# Patient Record
Sex: FEMALE | Race: WHITE | NOT HISPANIC OR LATINO | Employment: OTHER | URBAN - METROPOLITAN AREA
[De-identification: names, ages, dates, MRNs, and addresses within clinical notes are randomized per-mention and may not be internally consistent; named-entity substitution may affect disease eponyms.]

---

## 2019-04-25 ENCOUNTER — OFFICE VISIT (OUTPATIENT)
Dept: SURGERY | Facility: CLINIC | Age: 64
End: 2019-04-25

## 2019-04-25 VITALS — OXYGEN SATURATION: 97 % | BODY MASS INDEX: 33.63 KG/M2 | HEART RATE: 73 BPM | WEIGHT: 197 LBS | HEIGHT: 64 IN

## 2019-04-25 DIAGNOSIS — R07.9 CHEST PAIN OF UNKNOWN ETIOLOGY: Primary | ICD-10-CM

## 2019-04-25 PROCEDURE — 99244 OFF/OP CNSLTJ NEW/EST MOD 40: CPT | Performed by: SURGERY

## 2019-04-25 RX ORDER — PANTOPRAZOLE SODIUM 40 MG/1
40 TABLET, DELAYED RELEASE ORAL DAILY
COMMUNITY
End: 2022-12-27

## 2019-04-25 RX ORDER — VITAMIN E 268 MG
400 CAPSULE ORAL DAILY
COMMUNITY
End: 2022-12-27

## 2019-04-25 RX ORDER — ESTRADIOL 0.25 MG/.25G
1 GEL TOPICAL DAILY
COMMUNITY
End: 2022-12-27

## 2019-04-25 RX ORDER — LANOLIN ALCOHOL/MO/W.PET/CERES
1000 CREAM (GRAM) TOPICAL DAILY
COMMUNITY
End: 2022-12-27

## 2019-04-25 RX ORDER — SUCRALFATE 1 G/1
1 TABLET ORAL 4 TIMES DAILY
COMMUNITY
End: 2019-05-16 | Stop reason: HOSPADM

## 2019-04-25 RX ORDER — GLYCOPYRROLATE 2 MG/1
2 TABLET ORAL 3 TIMES DAILY
COMMUNITY
End: 2019-05-16 | Stop reason: HOSPADM

## 2019-04-25 NOTE — PROGRESS NOTES
SUMMARY (A/P):    64-year-old lady with intermittently severe chest pain with previous negative cardiac work-up.  Given this symptom, improvement with Protonix, negative CT scan, and mild dysphasia, I have recommended proceeding with upper endoscopy and she has scheduled accordingly.      CC:    Chest pain, referred for consultation by Dr. Knott    HPI:    64-year-old lady presents with 10-year history of intermittent at times severe pressure type pain in the midsternal region that is not necessarily associated with meals.  Protonix over the last year seems to have helped with her symptoms which overall have worsened over the years.  She denies any significant heartburn or reflux symptoms.  She does report mild mid upper chest dysphasia.    PSH:    Colonoscopy 2017  Cholecystectomy 2003  Hysterectomy 2002    PMH:    Arthritis  Nephrolithiasis    FAMILY HISTORY:    Paternal grandmother with colon cancer    SOCIAL HISTORY:   No tobacco use  No alcohol use    ALLERGIES: reviewed, in Epic    MEDICATIONS: reviewed, in Epic    ROS:  No chest pain or shortness of air.  All other systems reviewed and negative other than presenting complaints.    RADIOLOGY/ENDOSCOPY:    CT angiogram of the chest abdomen and pelvis 3/27/2019 demonstrated no acute abnormality    PHYSICAL EXAM:   Constitutional: Well-developed well-nourished, no acute distress  Vital signs: HR 73, weight 197 pounds, height 64 inches, BMI 33.8  Discussed with patient increased perioperative risks associated with obesity including increased risks of DVT, infection, seromas, poor wound healing and hernias (with abdominal surgery).  Eyes: Conjunctiva normal, sclera nonicteric  ENMT: Hearing grossly normal, oral mucosa moist  Neck: Supple, no palpable mass, normal thyroid, trachea midline  Respiratory: Clear to auscultation, normal inspiratory effort  Cardiovascular: Regular rate, no murmur, no carotid bruit, no peripheral edema, no jugular venous  distention  Gastrointestinal: Soft, nontender, no palpable mass, no hepatosplenomegaly, negative for hernia, bowel sounds normal  Lymphatics (palpable nodes):  cervical-negative, axillary-negative  Skin:  Warm, dry, no rash on visualized skin surfaces  Musculoskeletal: Symmetric strength, normal gait  Psychiatric: Alert and oriented ×3, normal affect     JETT HANSON M.D.

## 2019-04-29 PROBLEM — R07.9 CHEST PAIN OF UNKNOWN ETIOLOGY: Status: ACTIVE | Noted: 2019-04-29

## 2019-05-16 ENCOUNTER — ANESTHESIA (OUTPATIENT)
Dept: GASTROENTEROLOGY | Facility: HOSPITAL | Age: 64
End: 2019-05-16

## 2019-05-16 ENCOUNTER — HOSPITAL ENCOUNTER (OUTPATIENT)
Facility: HOSPITAL | Age: 64
Setting detail: HOSPITAL OUTPATIENT SURGERY
Discharge: HOME | End: 2019-05-16
Attending: SURGERY | Admitting: SURGERY

## 2019-05-16 ENCOUNTER — ANESTHESIA EVENT (OUTPATIENT)
Dept: GASTROENTEROLOGY | Facility: HOSPITAL | Age: 64
End: 2019-05-16

## 2019-05-16 VITALS
BODY MASS INDEX: 34.2 KG/M2 | SYSTOLIC BLOOD PRESSURE: 176 MMHG | HEIGHT: 63 IN | HEART RATE: 68 BPM | DIASTOLIC BLOOD PRESSURE: 93 MMHG | OXYGEN SATURATION: 98 % | RESPIRATION RATE: 24 BRPM | WEIGHT: 193 LBS

## 2019-05-16 DIAGNOSIS — R07.9 CHEST PAIN OF UNKNOWN ETIOLOGY: ICD-10-CM

## 2019-05-16 PROCEDURE — 43239 EGD BIOPSY SINGLE/MULTIPLE: CPT | Performed by: SURGERY

## 2019-05-16 PROCEDURE — 25010000002 PROPOFOL 10 MG/ML EMULSION: Performed by: NURSE ANESTHETIST, CERTIFIED REGISTERED

## 2019-05-16 PROCEDURE — 88305 TISSUE EXAM BY PATHOLOGIST: CPT | Performed by: SURGERY

## 2019-05-16 PROCEDURE — 88342 IMHCHEM/IMCYTCHM 1ST ANTB: CPT | Performed by: SURGERY

## 2019-05-16 RX ORDER — SODIUM CHLORIDE, SODIUM LACTATE, POTASSIUM CHLORIDE, CALCIUM CHLORIDE 600; 310; 30; 20 MG/100ML; MG/100ML; MG/100ML; MG/100ML
1000 INJECTION, SOLUTION INTRAVENOUS CONTINUOUS
Status: DISCONTINUED | OUTPATIENT
Start: 2019-05-16 | End: 2019-05-16 | Stop reason: HOSPADM

## 2019-05-16 RX ORDER — SODIUM CHLORIDE 0.9 % (FLUSH) 0.9 %
3 SYRINGE (ML) INJECTION AS NEEDED
Status: DISCONTINUED | OUTPATIENT
Start: 2019-05-16 | End: 2019-05-16 | Stop reason: HOSPADM

## 2019-05-16 RX ORDER — SODIUM CHLORIDE, SODIUM LACTATE, POTASSIUM CHLORIDE, CALCIUM CHLORIDE 600; 310; 30; 20 MG/100ML; MG/100ML; MG/100ML; MG/100ML
INJECTION, SOLUTION INTRAVENOUS CONTINUOUS PRN
Status: DISCONTINUED | OUTPATIENT
Start: 2019-05-16 | End: 2019-05-16 | Stop reason: SURG

## 2019-05-16 RX ORDER — PROPOFOL 10 MG/ML
VIAL (ML) INTRAVENOUS CONTINUOUS PRN
Status: DISCONTINUED | OUTPATIENT
Start: 2019-05-16 | End: 2019-05-16 | Stop reason: SURG

## 2019-05-16 RX ADMIN — SODIUM CHLORIDE, POTASSIUM CHLORIDE, SODIUM LACTATE AND CALCIUM CHLORIDE 1000 ML: 600; 310; 30; 20 INJECTION, SOLUTION INTRAVENOUS at 13:17

## 2019-05-16 RX ADMIN — PROPOFOL 100 MCG/KG/MIN: 10 INJECTION, EMULSION INTRAVENOUS at 13:46

## 2019-05-16 RX ADMIN — SODIUM CHLORIDE, POTASSIUM CHLORIDE, SODIUM LACTATE AND CALCIUM CHLORIDE: 600; 310; 30; 20 INJECTION, SOLUTION INTRAVENOUS at 13:44

## 2019-05-16 NOTE — ANESTHESIA POSTPROCEDURE EVALUATION
"Patient: Viviana Hyatt    Procedure Summary     Date:  05/16/19 Room / Location:   [facility]    Anesthesia Start:  1343 Anesthesia Stop:  1355    Procedure:  ESOPHAGOGASTRODUODENOSCOPY WITH COLD BIOPSIES (N/A Esophagus) Diagnosis:       Chest pain of unknown etiology      (Chest pain of unknown etiology [R07.89])    Surgeon:  Scar Turpin MD Provider:  Nila Christianson MD    Anesthesia Type:  MAC ASA Status:  2          Anesthesia Type: MAC  Last vitals  BP   176/93 (05/16/19 1417)   Temp       Pulse   68 (05/16/19 1417)   Resp   24 (05/16/19 1417)     SpO2   98 % (05/16/19 1417)     Post Anesthesia Care and Evaluation    Patient participation: complete - patient cannot participate  Anesthetic complications: No anesthetic complications    Cardiovascular status: acceptable  Respiratory status: acceptable  Hydration status: acceptable    Comments: Patient was discharged prior to being evaluated by Anesthesia...per chart review, no anesthetic complications were noted.  NOT MEDICALLY DIRECTED  /93 (BP Location: Left arm, Patient Position: Lying)   Pulse 68   Resp 24   Ht 160 cm (63\")   Wt 87.5 kg (193 lb)   SpO2 98%   BMI 34.19 kg/m²         "

## 2019-05-16 NOTE — OP NOTE
PREOPERATIVE DIAGNOSIS:  Atypical chest pain    POSTOPERATIVE DIAGNOSIS AND FINDINGS:  Mild to moderate antritis    PROCEDURE:  EGD with biopsy of gastric antrum    SURGEON:  Scar Turpin MD    ANESTHESIA:  MAC    SPECIMEN:  Antral biopsies    DESCRIPTION:  In decubitus position endoscope was inserted into the esophagus, stomach and duodenum. Antegrade and retroflex view of stomach performed.    There were no gross abnormalities of the first, second or third portions of the duodenum.    Gastric antrum, body and retroflexed view of stomach were normal with the exception of mild to moderate antritis with several erosions.  Several biopsies taken.    GE junction and esophagus were normal.  No hiatal hernia present.    Tolerated well.    Scar Turpin M.D.

## 2019-05-16 NOTE — ANESTHESIA PREPROCEDURE EVALUATION
Anesthesia Evaluation     Patient summary reviewed and Nursing notes reviewed   NPO Solid Status: > 8 hours  NPO Liquid Status: > 2 hours           Airway   Mallampati: II  TM distance: >3 FB  Neck ROM: full  Dental - normal exam     Pulmonary - negative pulmonary ROS and normal exam   Cardiovascular - negative cardio ROS and normal exam        Neuro/Psych- negative ROS  GI/Hepatic/Renal/Endo - negative ROS     Musculoskeletal     Abdominal    Substance History - negative use     OB/GYN negative ob/gyn ROS         Other   (+) arthritis                     Anesthesia Plan    ASA 2     MAC     Anesthetic plan, all risks, benefits, and alternatives have been provided, discussed and informed consent has been obtained with: patient.

## 2019-05-17 LAB
CYTO UR: NORMAL
LAB AP CASE REPORT: NORMAL
LAB AP DIAGNOSIS COMMENT: NORMAL
LAB AP SPECIAL STAINS: NORMAL
PATH REPORT.FINAL DX SPEC: NORMAL
PATH REPORT.GROSS SPEC: NORMAL

## 2019-05-19 ENCOUNTER — DOCUMENTATION (OUTPATIENT)
Dept: SURGERY | Facility: CLINIC | Age: 64
End: 2019-05-19

## 2019-05-19 NOTE — PROGRESS NOTES
ENDOSCOPY FOLLOW UP NOTE    EGD 5/16/2019    Indication:  Atypical chest pain    Findings:  Mild to moderate antritis    Pathology:  Minimally active chronic inflammation.  Rare Helicobacter-like organisms present.    Recommendations:  With the gross findings of antritis and microscopic findings of Helicobacter-like organisms, I have recommended a course of treatment for her Helicobacter.  If this resolves her symptoms then no further treatment or work-up would be warranted.    Scar Turpin M.D.

## 2019-05-21 ENCOUNTER — TELEPHONE (OUTPATIENT)
Dept: SURGERY | Facility: CLINIC | Age: 64
End: 2019-05-21

## 2019-05-21 NOTE — TELEPHONE ENCOUNTER
----- Message from Scar Turpin MD sent at 5/19/2019 10:44 AM EDT -----  Please let her know that she had inflammation of her stomach and biopsies revealed Helicobacter pylori organisms.  This could definitely be responsible for her symptoms and please initiate H. pylori treatment protocol.

## 2019-05-23 ENCOUNTER — TELEPHONE (OUTPATIENT)
Dept: SURGERY | Facility: CLINIC | Age: 64
End: 2019-05-23

## 2019-07-09 ENCOUNTER — TELEPHONE (OUTPATIENT)
Dept: SURGERY | Facility: CLINIC | Age: 64
End: 2019-07-09

## 2019-07-09 ENCOUNTER — LAB (OUTPATIENT)
Dept: LAB | Facility: HOSPITAL | Age: 64
End: 2019-07-09

## 2019-07-09 ENCOUNTER — TRANSCRIBE ORDERS (OUTPATIENT)
Dept: SURGERY | Facility: CLINIC | Age: 64
End: 2019-07-09

## 2019-07-09 DIAGNOSIS — A04.8 H. PYLORI INFECTION: ICD-10-CM

## 2019-07-09 DIAGNOSIS — A04.8 H. PYLORI INFECTION: Primary | ICD-10-CM

## 2019-07-09 PROCEDURE — 83013 H PYLORI (C-13) BREATH: CPT

## 2019-07-10 LAB — UREA BREATH TEST QL: NEGATIVE

## 2022-08-31 ENCOUNTER — APPOINTMENT (OUTPATIENT)
Dept: MRI IMAGING | Facility: HOSPITAL | Age: 67
End: 2022-08-31

## 2022-08-31 ENCOUNTER — APPOINTMENT (OUTPATIENT)
Dept: GENERAL RADIOLOGY | Facility: HOSPITAL | Age: 67
End: 2022-08-31

## 2022-08-31 ENCOUNTER — HOSPITAL ENCOUNTER (EMERGENCY)
Facility: HOSPITAL | Age: 67
Discharge: HOME | End: 2022-08-31
Attending: EMERGENCY MEDICINE | Admitting: EMERGENCY MEDICINE

## 2022-08-31 VITALS
HEART RATE: 67 BPM | WEIGHT: 197 LBS | BODY MASS INDEX: 34.91 KG/M2 | RESPIRATION RATE: 18 BRPM | DIASTOLIC BLOOD PRESSURE: 100 MMHG | TEMPERATURE: 98 F | HEIGHT: 63 IN | OXYGEN SATURATION: 98 % | SYSTOLIC BLOOD PRESSURE: 192 MMHG

## 2022-08-31 DIAGNOSIS — H81.392 PERIPHERAL VERTIGO INVOLVING LEFT EAR: Primary | ICD-10-CM

## 2022-08-31 LAB
ALBUMIN SERPL-MCNC: 5 G/DL (ref 3.5–5.2)
ALBUMIN/GLOB SERPL: 1.9 G/DL
ALP SERPL-CCNC: 93 U/L (ref 39–117)
ALT SERPL W P-5'-P-CCNC: 39 U/L (ref 1–33)
ANION GAP SERPL CALCULATED.3IONS-SCNC: 7 MMOL/L (ref 5–15)
AST SERPL-CCNC: 34 U/L (ref 1–32)
BASOPHILS # BLD AUTO: 0.04 10*3/MM3 (ref 0–0.2)
BASOPHILS NFR BLD AUTO: 0.7 % (ref 0–1.5)
BILIRUB SERPL-MCNC: 0.8 MG/DL (ref 0–1.2)
BUN SERPL-MCNC: 12 MG/DL (ref 8–23)
BUN/CREAT SERPL: 19 (ref 7–25)
CALCIUM SPEC-SCNC: 10.3 MG/DL (ref 8.6–10.5)
CHLORIDE SERPL-SCNC: 105 MMOL/L (ref 98–107)
CO2 SERPL-SCNC: 32 MMOL/L (ref 22–29)
CREAT SERPL-MCNC: 0.63 MG/DL (ref 0.57–1)
DEPRECATED RDW RBC AUTO: 43.4 FL (ref 37–54)
EGFRCR SERPLBLD CKD-EPI 2021: 98 ML/MIN/1.73
EOSINOPHIL # BLD AUTO: 0.02 10*3/MM3 (ref 0–0.4)
EOSINOPHIL NFR BLD AUTO: 0.4 % (ref 0.3–6.2)
ERYTHROCYTE [DISTWIDTH] IN BLOOD BY AUTOMATED COUNT: 12 % (ref 12.3–15.4)
GLOBULIN UR ELPH-MCNC: 2.7 GM/DL
GLUCOSE SERPL-MCNC: 106 MG/DL (ref 65–99)
HCT VFR BLD AUTO: 48.7 % (ref 34–46.6)
HGB BLD-MCNC: 17.1 G/DL (ref 12–15.9)
HOLD SPECIMEN: NORMAL
IMM GRANULOCYTES # BLD AUTO: 0.02 10*3/MM3 (ref 0–0.05)
IMM GRANULOCYTES NFR BLD AUTO: 0.4 % (ref 0–0.5)
LIPASE SERPL-CCNC: 14 U/L (ref 13–60)
LYMPHOCYTES # BLD AUTO: 1.47 10*3/MM3 (ref 0.7–3.1)
LYMPHOCYTES NFR BLD AUTO: 26.9 % (ref 19.6–45.3)
MCH RBC QN AUTO: 34.3 PG (ref 26.6–33)
MCHC RBC AUTO-ENTMCNC: 35.1 G/DL (ref 31.5–35.7)
MCV RBC AUTO: 97.6 FL (ref 79–97)
MONOCYTES # BLD AUTO: 0.63 10*3/MM3 (ref 0.1–0.9)
MONOCYTES NFR BLD AUTO: 11.5 % (ref 5–12)
NEUTROPHILS NFR BLD AUTO: 3.28 10*3/MM3 (ref 1.7–7)
NEUTROPHILS NFR BLD AUTO: 60.1 % (ref 42.7–76)
NRBC BLD AUTO-RTO: 0 /100 WBC (ref 0–0.2)
NT-PROBNP SERPL-MCNC: 56.2 PG/ML (ref 0–900)
PLATELET # BLD AUTO: 204 10*3/MM3 (ref 140–450)
PMV BLD AUTO: 10.8 FL (ref 6–12)
POTASSIUM SERPL-SCNC: 4.8 MMOL/L (ref 3.5–5.2)
PROT SERPL-MCNC: 7.7 G/DL (ref 6–8.5)
RBC # BLD AUTO: 4.99 10*6/MM3 (ref 3.77–5.28)
SODIUM SERPL-SCNC: 144 MMOL/L (ref 136–145)
TROPONIN T SERPL-MCNC: <0.01 NG/ML (ref 0–0.03)
TROPONIN T SERPL-MCNC: <0.01 NG/ML (ref 0–0.03)
WBC NRBC COR # BLD: 5.46 10*3/MM3 (ref 3.4–10.8)
WHOLE BLOOD HOLD COAG: NORMAL
WHOLE BLOOD HOLD SPECIMEN: NORMAL

## 2022-08-31 PROCEDURE — 85025 COMPLETE CBC W/AUTO DIFF WBC: CPT | Performed by: EMERGENCY MEDICINE

## 2022-08-31 PROCEDURE — 80053 COMPREHEN METABOLIC PANEL: CPT | Performed by: EMERGENCY MEDICINE

## 2022-08-31 PROCEDURE — 36415 COLL VENOUS BLD VENIPUNCTURE: CPT

## 2022-08-31 PROCEDURE — 99284 EMERGENCY DEPT VISIT MOD MDM: CPT

## 2022-08-31 PROCEDURE — 71045 X-RAY EXAM CHEST 1 VIEW: CPT

## 2022-08-31 PROCEDURE — 63710000001 ONDANSETRON ODT 4 MG TABLET DISPERSIBLE: Performed by: EMERGENCY MEDICINE

## 2022-08-31 PROCEDURE — 83880 ASSAY OF NATRIURETIC PEPTIDE: CPT | Performed by: EMERGENCY MEDICINE

## 2022-08-31 PROCEDURE — 70551 MRI BRAIN STEM W/O DYE: CPT

## 2022-08-31 PROCEDURE — 84484 ASSAY OF TROPONIN QUANT: CPT | Performed by: EMERGENCY MEDICINE

## 2022-08-31 PROCEDURE — 83690 ASSAY OF LIPASE: CPT | Performed by: EMERGENCY MEDICINE

## 2022-08-31 PROCEDURE — 93005 ELECTROCARDIOGRAM TRACING: CPT | Performed by: EMERGENCY MEDICINE

## 2022-08-31 RX ORDER — SODIUM CHLORIDE 0.9 % (FLUSH) 0.9 %
10 SYRINGE (ML) INJECTION AS NEEDED
Status: DISCONTINUED | OUTPATIENT
Start: 2022-08-31 | End: 2022-08-31 | Stop reason: HOSPADM

## 2022-08-31 RX ORDER — MECLIZINE HYDROCHLORIDE 25 MG/1
25 TABLET ORAL EVERY 6 HOURS PRN
Qty: 20 TABLET | Refills: 0 | Status: SHIPPED | OUTPATIENT
Start: 2022-08-31 | End: 2022-09-05

## 2022-08-31 RX ORDER — ONDANSETRON 4 MG/1
4 TABLET, ORALLY DISINTEGRATING ORAL ONCE
Status: COMPLETED | OUTPATIENT
Start: 2022-08-31 | End: 2022-08-31

## 2022-08-31 RX ORDER — MECLIZINE HYDROCHLORIDE 25 MG/1
25 TABLET ORAL ONCE
Status: COMPLETED | OUTPATIENT
Start: 2022-08-31 | End: 2022-08-31

## 2022-08-31 RX ORDER — ASPIRIN 81 MG/1
324 TABLET, CHEWABLE ORAL ONCE
Status: COMPLETED | OUTPATIENT
Start: 2022-08-31 | End: 2022-08-31

## 2022-08-31 RX ADMIN — ASPIRIN 81 MG 324 MG: 81 TABLET ORAL at 20:10

## 2022-08-31 RX ADMIN — SODIUM CHLORIDE 1000 ML: 9 INJECTION, SOLUTION INTRAVENOUS at 18:48

## 2022-08-31 RX ADMIN — MECLIZINE HYDROCHLORIDE 25 MG: 25 TABLET ORAL at 18:03

## 2022-08-31 RX ADMIN — ONDANSETRON 4 MG: 4 TABLET, ORALLY DISINTEGRATING ORAL at 18:03

## 2022-09-01 NOTE — DISCHARGE INSTRUCTIONS
Make sure the rest and drink plenty of fluids.    Take meclizine as needed to help with dizziness.    Follow-up with primary care physician for further outpatient evaluation.

## 2022-09-02 LAB
QT INTERVAL: 404 MS
QT INTERVAL: 406 MS
QTC INTERVAL: 431 MS
QTC INTERVAL: 436 MS

## 2022-11-08 ENCOUNTER — PREP FOR SURGERY (OUTPATIENT)
Dept: OTHER | Facility: HOSPITAL | Age: 67
End: 2022-11-08

## 2022-11-08 DIAGNOSIS — Z86.010 HISTORY OF ADENOMATOUS POLYP OF COLON: Primary | ICD-10-CM

## 2022-12-27 ENCOUNTER — OFFICE VISIT (OUTPATIENT)
Dept: SURGERY | Facility: CLINIC | Age: 67
End: 2022-12-27

## 2022-12-27 VITALS — HEIGHT: 63 IN | BODY MASS INDEX: 35.08 KG/M2 | WEIGHT: 198 LBS

## 2022-12-27 DIAGNOSIS — Z12.11 SCREEN FOR COLON CANCER: Primary | ICD-10-CM

## 2022-12-27 DIAGNOSIS — R10.32 LLQ ABDOMINAL PAIN: ICD-10-CM

## 2022-12-27 PROBLEM — Z86.010 HISTORY OF ADENOMATOUS POLYP OF COLON: Status: ACTIVE | Noted: 2022-12-27

## 2022-12-27 PROCEDURE — 99203 OFFICE O/P NEW LOW 30 MIN: CPT | Performed by: PHYSICIAN ASSISTANT

## 2022-12-27 RX ORDER — LOSARTAN POTASSIUM 50 MG/1
50 TABLET ORAL DAILY
COMMUNITY
Start: 2022-10-31

## 2022-12-27 NOTE — H&P (VIEW-ONLY)
ASSESSMENT/PLAN:    This is a 67-year-old lady presenting with recent left lower quadrant/left groin pain more consistent with pain that she is experienced with passing kidney stones in the past.  She did have a negative CT scan of the abdomen and pelvis without evidence of an obstructing kidney stone.  She does bring up the changes to her stool habit with more mucousy stools of recent.  With this and the need for her routine screening colonoscopy already being necessary, I am recommending that we proceed with a colonoscopy with possible random biopsies.  Risks and rationale were discussed with her with risk including but not limited to bleeding, infection, bowel perforation and the need for additional procedures.  Any additional follow-up will be discussed once the results of been reviewed.  All questions were answered and she was willing to proceed with all recommendations.    CC:     Left lower quadrant/groin abdominal pain    HPI:    This is a 67-year-old lady presenting to the office today as a self-referral.  She last saw Dr. Turpin in 2019 at the time of her EGD.  She was due to be scheduled for her routine screening colonoscopy but at the time of scheduling, she developed sharp left lower quadrant and groin pain.  This lasted for approximately 2 to 3 weeks and was very similar to previous pains that she had experienced with kidney stones.  Due to this she went to her urologist who performed a CT scan which did not demonstrate an obstructing kidney stone but did note a decrease in the stone burden of her kidneys without any additional acute abnormalities noted.  Since that 2 to 3-week period, her pain has improved dramatically and now she only has some minor discomfort on occasion in that area.  She does note that she has had more mucousy stools of recent but aside from that denies having abdominal pain elsewhere, nausea, vomiting, fever, chills, dark tarry stools, bright red blood with her bowel movements  "or any other complaints.    ENDOSCOPY:   • Colonoscopy-2017 history of colon polyps    RADIOLOGY:   • CT scan of the abdomen pelvis: 12/12/2022 no suspicious findings    SOCIAL HISTORY:   • Denies tobacco use  • Denies alcohol use    FAMILY HISTORY:    • Colorectal cancer: Paternal grandmother    PREVIOUS ABDOMINAL SURGERY    • Hysterectomy  • Cholecystectomy    OTHER SURGERY  Past Surgical History:   Procedure Laterality Date   • BREAST BIOPSY Left 04/2017   • CHOLECYSTECTOMY N/A 2003   • COLONOSCOPY N/A 03/24/2017    Mild diverticulosis, small polyp in the distal descending colon-Dr. Juárez   • ENDOSCOPY N/A 05/16/2019    Procedure: ESOPHAGOGASTRODUODENOSCOPY WITH COLD BIOPSIES;  Surgeon: Scar Turpin MD;  Location: [facility] END[facility]COPY;  Service: General   • HYSTERECTOMY N/A 06/2002       PAST MEDICAL HISTORY:    Past Medical History:   Diagnosis Date   • Arthritis    • Colon polyp    • Diverticulosis    • History of Helicobacter pylori infection    • Kidney stones    • Skin cancer     Chest       MEDICATIONS:     Current Outpatient Medications:   •  losartan (COZAAR) 50 MG tablet, Take 50 mg by mouth Daily., Disp: , Rfl:     ALLERGIES:   No Known Allergies    PHYSICAL EXAM:   • Constitutional: Well-developed well-nourished, no acute distress  • Vital signs:   o Height 63\"  o Weight 198  o BMI 35.1 Discussed with patient increased perioperative risks associated with obesity including increased risks of DVT, infection, seromas, poor wound healing and hernias (with abdominal surgery).  • Neck: Supple, no palpable mass, trachea midline  • Respiratory: Clear to auscultation, normal inspiratory effort  • Cardiovascular: Regular rate, no murmur, no carotid bruit  • Gastrointestinal: Soft, nontender  • Psychiatric: Alert and oriented ×3, normal affect       Jhonny Hawkins PA-C    "

## 2022-12-27 NOTE — PROGRESS NOTES
ASSESSMENT/PLAN:    This is a 67-year-old lady presenting with recent left lower quadrant/left groin pain more consistent with pain that she is experienced with passing kidney stones in the past.  She did have a negative CT scan of the abdomen and pelvis without evidence of an obstructing kidney stone.  She does bring up the changes to her stool habit with more mucousy stools of recent.  With this and the need for her routine screening colonoscopy already being necessary, I am recommending that we proceed with a colonoscopy with possible random biopsies.  Risks and rationale were discussed with her with risk including but not limited to bleeding, infection, bowel perforation and the need for additional procedures.  Any additional follow-up will be discussed once the results of been reviewed.  All questions were answered and she was willing to proceed with all recommendations.    CC:     Left lower quadrant/groin abdominal pain    HPI:    This is a 67-year-old lady presenting to the office today as a self-referral.  She last saw Dr. Turpin in 2019 at the time of her EGD.  She was due to be scheduled for her routine screening colonoscopy but at the time of scheduling, she developed sharp left lower quadrant and groin pain.  This lasted for approximately 2 to 3 weeks and was very similar to previous pains that she had experienced with kidney stones.  Due to this she went to her urologist who performed a CT scan which did not demonstrate an obstructing kidney stone but did note a decrease in the stone burden of her kidneys without any additional acute abnormalities noted.  Since that 2 to 3-week period, her pain has improved dramatically and now she only has some minor discomfort on occasion in that area.  She does note that she has had more mucousy stools of recent but aside from that denies having abdominal pain elsewhere, nausea, vomiting, fever, chills, dark tarry stools, bright red blood with her bowel movements  "or any other complaints.    ENDOSCOPY:   • Colonoscopy-2017 history of colon polyps    RADIOLOGY:   • CT scan of the abdomen pelvis: 12/12/2022 no suspicious findings    SOCIAL HISTORY:   • Denies tobacco use  • Denies alcohol use    FAMILY HISTORY:    • Colorectal cancer: Paternal grandmother    PREVIOUS ABDOMINAL SURGERY    • Hysterectomy  • Cholecystectomy    OTHER SURGERY  Past Surgical History:   Procedure Laterality Date   • BREAST BIOPSY Left 04/2017   • CHOLECYSTECTOMY N/A 2003   • COLONOSCOPY N/A 03/24/2017    Mild diverticulosis, small polyp in the distal descending colon-Dr. Juárez   • ENDOSCOPY N/A 05/16/2019    Procedure: ESOPHAGOGASTRODUODENOSCOPY WITH COLD BIOPSIES;  Surgeon: Scar Turpin MD;  Location: [facility] ENDOSCOPY;  Service: General   • HYSTERECTOMY N/A 06/2002       PAST MEDICAL HISTORY:    Past Medical History:   Diagnosis Date   • Arthritis    • Colon polyp    • Diverticulosis    • History of Helicobacter pylori infection    • Kidney stones    • Skin cancer     Chest       MEDICATIONS:     Current Outpatient Medications:   •  losartan (COZAAR) 50 MG tablet, Take 50 mg by mouth Daily., Disp: , Rfl:     ALLERGIES:   No Known Allergies    PHYSICAL EXAM:   • Constitutional: Well-developed well-nourished, no acute distress  • Vital signs:   o Height 63\"  o Weight 198  o BMI 35.1 Discussed with patient increased perioperative risks associated with obesity including increased risks of DVT, infection, seromas, poor wound healing and hernias (with abdominal surgery).  • Neck: Supple, no palpable mass, trachea midline  • Respiratory: Clear to auscultation, normal inspiratory effort  • Cardiovascular: Regular rate, no murmur, no carotid bruit  • Gastrointestinal: Soft, nontender  • Psychiatric: Alert and oriented ×3, normal affect       Jhonny Hawkins PA-C    "

## 2023-01-19 ENCOUNTER — ANESTHESIA (OUTPATIENT)
Dept: GASTROENTEROLOGY | Facility: HOSPITAL | Age: 68
End: 2023-01-19
Payer: MEDICARE

## 2023-01-19 ENCOUNTER — HOSPITAL ENCOUNTER (OUTPATIENT)
Facility: HOSPITAL | Age: 68
Setting detail: HOSPITAL OUTPATIENT SURGERY
Discharge: HOME | End: 2023-01-19
Attending: SURGERY | Admitting: SURGERY
Payer: MEDICARE

## 2023-01-19 ENCOUNTER — ANESTHESIA EVENT (OUTPATIENT)
Dept: GASTROENTEROLOGY | Facility: HOSPITAL | Age: 68
End: 2023-01-19
Payer: MEDICARE

## 2023-01-19 VITALS
HEIGHT: 64 IN | TEMPERATURE: 97.3 F | SYSTOLIC BLOOD PRESSURE: 144 MMHG | RESPIRATION RATE: 18 BRPM | OXYGEN SATURATION: 95 % | HEART RATE: 64 BPM | WEIGHT: 195 LBS | DIASTOLIC BLOOD PRESSURE: 85 MMHG | BODY MASS INDEX: 33.29 KG/M2

## 2023-01-19 PROCEDURE — 25010000002 PROPOFOL 10 MG/ML EMULSION: Performed by: NURSE ANESTHETIST, CERTIFIED REGISTERED

## 2023-01-19 PROCEDURE — G0105 COLORECTAL SCRN; HI RISK IND: HCPCS | Performed by: SURGERY

## 2023-01-19 RX ORDER — LIDOCAINE HYDROCHLORIDE 20 MG/ML
INJECTION, SOLUTION INFILTRATION; PERINEURAL AS NEEDED
Status: DISCONTINUED | OUTPATIENT
Start: 2023-01-19 | End: 2023-01-19 | Stop reason: SURG

## 2023-01-19 RX ORDER — SODIUM CHLORIDE, SODIUM LACTATE, POTASSIUM CHLORIDE, CALCIUM CHLORIDE 600; 310; 30; 20 MG/100ML; MG/100ML; MG/100ML; MG/100ML
30 INJECTION, SOLUTION INTRAVENOUS CONTINUOUS PRN
Status: DISCONTINUED | OUTPATIENT
Start: 2023-01-19 | End: 2023-01-19 | Stop reason: HOSPADM

## 2023-01-19 RX ADMIN — LIDOCAINE HYDROCHLORIDE 100 MG: 20 INJECTION, SOLUTION INFILTRATION; PERINEURAL at 14:04

## 2023-01-19 RX ADMIN — SODIUM CHLORIDE, POTASSIUM CHLORIDE, SODIUM LACTATE AND CALCIUM CHLORIDE 30 ML/HR: 600; 310; 30; 20 INJECTION, SOLUTION INTRAVENOUS at 13:28

## 2023-01-19 RX ADMIN — PROPOFOL 200 MCG/KG/MIN: 10 INJECTION, EMULSION INTRAVENOUS at 14:04

## 2023-01-19 NOTE — DISCHARGE INSTRUCTIONS

## 2023-01-19 NOTE — OP NOTE
PREOPERATIVE DIAGNOSIS:  • High risk screening secondary to personal history of polyps    POSTOPERATIVE DIAGNOSIS AND FINDINGS:  • Diverticulosis    PROCEDURE:  Colonoscopy to terminal ileum    SURGEON:  Scar Turpin MD    ANESTHESIA:  MAC    SPECIMEN(S):  • none    DESCRIPTION:  In decubitus position digital rectal exam was normal. Colonoscope inserted under direct visualization of lumen to cecum confirmed by visualization of ileocecal valve and appendiceal orifice.  Ileocecal valve was intubated and terminal ileum was grossly normal.  Scope was slowly withdrawn circumferentially examining all mucosal surfaces.  Bowel preparation was excellent and there were no mucosal abnormalities.  Sigmoid diverticulosis was present.  Tolerated well.    RECOMMENDATION FOR FUTURE SURVEILLANCE:  5 years    Scar Turpin M.D.

## 2023-01-19 NOTE — ANESTHESIA PREPROCEDURE EVALUATION
Anesthesia Evaluation     Patient summary reviewed   no history of anesthetic complications:  NPO Solid Status: > 8 hours  NPO Liquid Status: > 2 hours           Airway   Mallampati: II  TM distance: >3 FB  Neck ROM: full  No difficulty expected  Dental - normal exam     Pulmonary     breath sounds clear to auscultation  (-) shortness of breath, recent URI, not a smoker  Cardiovascular   Exercise tolerance: good (4-7 METS)    Rhythm: regular  Rate: normal    (+) hypertension, valvular problems/murmurs MVP,   (-) past MI, dysrhythmias, angina      Neuro/Psych  (-) seizures, CVADizziness: vertigo episode, 8/2022.  GI/Hepatic/Renal/Endo    (+) obesity,   renal disease (h/o) stones,   (-) diabetes    Musculoskeletal     (-) neck stiffness  Abdominal    Substance History      OB/GYN          Other   arthritis,                        Anesthesia Plan    ASA 2     MAC     (MAC anesthesia discussed with patient and/or patient representative. Risks (including but not limited to intra-op awareness), benefits, and alternatives were discussed. Understanding was voiced with an agreement to proceed with a MAC technique and General as a backup option. )    Anesthetic plan, risks, benefits, and alternatives have been provided, discussed and informed consent has been obtained with: patient.    Plan discussed with CRNA.

## 2023-01-19 NOTE — ANESTHESIA POSTPROCEDURE EVALUATION
"Patient: Viviana Hyatt    Procedure Summary     Date: 01/19/23 Room / Location:  [facility]    Anesthesia Start: 1358 Anesthesia Stop: 1418    Procedure: COLONOSCOPY to cecum into TI Diagnosis:       History of adenomatous polyp of colon      (History of adenomatous polyp of colon [Z86.010])    Surgeons: Scar Turpin MD Provider: Sarbjit Sesay DO    Anesthesia Type: MAC ASA Status: 2          Anesthesia Type: MAC    Vitals  Vitals Value Taken Time   /71 01/19/23 1418   Temp     Pulse 65 01/19/23 1425   Resp 18 01/19/23 1418   SpO2 99 % 01/19/23 1425   Vitals shown include unvalidated device data.        Post Anesthesia Care and Evaluation    Patient location during evaluation: bedside  Patient participation: waiting for patient participation  Level of consciousness: sleepy but conscious and responsive to verbal stimuli  Pain management: adequate    Airway patency: patent  Anesthetic complications: No anesthetic complications  PONV Status: NA  Cardiovascular status: acceptable and hemodynamically stable  Respiratory status: acceptable, spontaneous ventilation and nonlabored ventilation  Hydration status: acceptable    Comments: /71 (BP Location: Left arm, Patient Position: Lying)   Pulse 67   Resp 18   Ht 161.3 cm (63.5\")   Wt 88.5 kg (195 lb)   SpO2 97%   BMI 34.00 kg/m²       "

## 2024-02-20 ENCOUNTER — OFFICE VISIT (OUTPATIENT)
Dept: CARDIOLOGY | Facility: CLINIC | Age: 69
End: 2024-02-20
Payer: MEDICARE

## 2024-02-20 VITALS
BODY MASS INDEX: 33.26 KG/M2 | DIASTOLIC BLOOD PRESSURE: 84 MMHG | WEIGHT: 194.8 LBS | HEIGHT: 64 IN | HEART RATE: 62 BPM | SYSTOLIC BLOOD PRESSURE: 128 MMHG

## 2024-02-20 DIAGNOSIS — I34.1 MITRAL VALVE PROLAPSE: ICD-10-CM

## 2024-02-20 DIAGNOSIS — R07.2 PRECORDIAL PAIN: Primary | ICD-10-CM

## 2024-02-20 DIAGNOSIS — I10 ESSENTIAL HYPERTENSION: ICD-10-CM

## 2024-02-20 DIAGNOSIS — R06.09 EXERTIONAL DYSPNEA: ICD-10-CM

## 2024-02-20 DIAGNOSIS — R00.2 PALPITATIONS: ICD-10-CM

## 2024-02-20 PROCEDURE — 93000 ELECTROCARDIOGRAM COMPLETE: CPT | Performed by: INTERNAL MEDICINE

## 2024-02-20 PROCEDURE — 99204 OFFICE O/P NEW MOD 45 MIN: CPT | Performed by: INTERNAL MEDICINE

## 2024-02-20 PROCEDURE — 1159F MED LIST DOCD IN RCRD: CPT | Performed by: INTERNAL MEDICINE

## 2024-02-20 PROCEDURE — 1160F RVW MEDS BY RX/DR IN RCRD: CPT | Performed by: INTERNAL MEDICINE

## 2024-02-20 NOTE — PROGRESS NOTES
[facility]      Patient Name: Viviana Hyatt  :1955  Age: 68 y.o.  Encounter Provider:  Niko Ferrer Jr, MD      Chief Complaint:   Chief Complaint   Patient presents with    Establish Care     Chest pain          HPI  Viviana Hyatt is a 68 y.o. female past medical history of hypertension and mitral valve prolapse presents for evaluation of chest discomfort.  Patient has been experiencing chest discomfort over the last 2 weeks.  She describes this as a squeezing or pressure-like sensation on the left anterior thoracic region with occasional radiation down the left arm.  No clear provocation with physical activity.  No clear temporal relationship to food intake.  She does have a history of H. pylori infection and was worked up for acid reflux about 4 to 5 years ago.  She has no personal history of cardiac disease but her father had a bypass at age 60.  She has history of mitral valve prolapse with no recent follow-up.  She has decreasing functional capacity with increased dyspnea on exertion.  No orthopnea, PND or edema.  She has occasional palpitations with no dizziness or syncope.  Her blood pressure was running high and her primary care practitioner increase losartan from 75 to 100 mg.  She has noted a decrease in chest discomfort since then.  Blood pressure and heart rate are well-controlled today in clinic at 128/84 mmHg and 62 bpm respectively.  She is a lifelong non-smoker who denies alcohol or illicit drug use.        The following portions of the patient's history were reviewed and updated as appropriate: allergies, current medications, past family history, past medical history, past social history, past surgical history and problem list.      Review of Systems   Constitutional: Negative for chills and fever.   HENT:  Negative for hoarse voice and sore throat.    Eyes:  Negative for double vision and photophobia.   Cardiovascular:  Positive for chest pain, dyspnea on exertion  "and palpitations. Negative for leg swelling, near-syncope, orthopnea, paroxysmal nocturnal dyspnea and syncope.   Respiratory:  Negative for cough and wheezing.    Skin:  Negative for poor wound healing and rash.   Musculoskeletal:  Negative for arthritis and joint swelling.   Gastrointestinal:  Negative for bloating, abdominal pain, hematemesis and hematochezia.   Neurological:  Negative for dizziness and focal weakness.   Psychiatric/Behavioral:  Negative for depression and suicidal ideas.      OBJECTIVE:   Vital Signs  Vitals:    02/20/24 0752   BP: 128/84   Pulse: 62     Estimated body mass index is 33.97 kg/m² as calculated from the following:    Height as of this encounter: 161.3 cm (63.5\").    Weight as of this encounter: 88.4 kg (194 lb 12.8 oz).    Vitals reviewed.   Constitutional:       Appearance: Healthy appearance. Not in distress.   Neck:      Vascular: No JVR. JVD normal.   Pulmonary:      Effort: Pulmonary effort is normal.      Breath sounds: Normal breath sounds. No wheezing. No rhonchi. No rales.   Chest:      Chest wall: Not tender to palpatation.   Cardiovascular:      PMI at left midclavicular line. Normal rate. Regular rhythm. Normal S1. Normal S2.       Murmurs: There is no murmur.      No gallop.  No click. No rub.   Pulses:     Intact distal pulses.   Edema:     Peripheral edema absent.   Abdominal:      General: Bowel sounds are normal.      Palpations: Abdomen is soft.      Tenderness: There is no abdominal tenderness.   Musculoskeletal: Normal range of motion.         General: No tenderness. Skin:     General: Skin is warm and dry.   Neurological:      General: No focal deficit present.      Mental Status: Alert and oriented to person, place and time.       ECG 12 Lead    Date/Time: 2/20/2024 8:30 AM  Performed by: Niko Ferrer Jr., MD    Authorized by: Niko Ferrer Jr., MD  Comparison: not compared with previous ECG   Previous ECG: no previous ECG available  Rhythm: sinus " rhythm  Other findings: low voltage and poor R wave progression    Clinical impression: non-specific ECG           BUN   Date Value Ref Range Status   08/31/2022 12 8 - 23 mg/dL Final     Creatinine   Date Value Ref Range Status   08/31/2022 0.63 0.57 - 1.00 mg/dL Final     Potassium   Date Value Ref Range Status   08/31/2022 4.8 3.5 - 5.2 mmol/L Final     Comment:     Slight hemolysis detected by analyzer. Results may be affected.     ALT (SGPT)   Date Value Ref Range Status   08/31/2022 39 (H) 1 - 33 U/L Final     AST (SGOT)   Date Value Ref Range Status   08/31/2022 34 (H) 1 - 32 U/L Final           ASSESSMENT:     68-year-old female presents for evaluation of precordial pain      PLAN OF CARE:     Precordial pain -typical and atypical characteristics.  She cannot perform at least 5 METS.  Plan for pharmacological nuclear stress study.  Mitral valve prolapse -no clear evidence of murmur on exam.  We will check an echocardiogram.  Exertional dyspnea -etiology uncertain.  May be multifactorial including obesity and deconditioning.  I have asked her to hold off on exercise program until stress study and echocardiogram are performed.  Essential hypertension -blood pressure seems well-controlled today.  She is only been on the higher dose of medication which is a minimal increase for the last week.  She will send in a twice daily blood pressure log and continue sodium restricted diet.    Return to clinic 6 months             Discharge Medications            Accurate as of February 20, 2024  8:27 AM. If you have any questions, ask your nurse or doctor.                Continue These Medications        Instructions Start Date   losartan 50 MG tablet  Commonly known as: COZAAR   100 mg, Oral, Daily               Thank you for allowing me to participate in the care of your patient,      Sincerely,   Niko Ferrer Jr, MD  [facility]  02/20/24  08:27 EST

## 2024-03-01 ENCOUNTER — TELEPHONE (OUTPATIENT)
Dept: CARDIOLOGY | Facility: CLINIC | Age: 69
End: 2024-03-01
Payer: MEDICARE

## 2024-03-04 ENCOUNTER — HOSPITAL ENCOUNTER (OUTPATIENT)
Dept: CARDIOLOGY | Facility: HOSPITAL | Age: 69
Discharge: HOME | End: 2024-03-04
Payer: MEDICARE

## 2024-03-04 ENCOUNTER — TELEPHONE (OUTPATIENT)
Dept: CARDIOLOGY | Facility: CLINIC | Age: 69
End: 2024-03-04
Payer: MEDICARE

## 2024-03-04 VITALS
HEART RATE: 61 BPM | HEIGHT: 64 IN | SYSTOLIC BLOOD PRESSURE: 148 MMHG | OXYGEN SATURATION: 98 % | DIASTOLIC BLOOD PRESSURE: 68 MMHG | WEIGHT: 194.89 LBS | BODY MASS INDEX: 33.27 KG/M2

## 2024-03-04 VITALS — WEIGHT: 194.89 LBS | BODY MASS INDEX: 33.27 KG/M2 | HEIGHT: 64 IN

## 2024-03-04 DIAGNOSIS — R07.2 PRECORDIAL PAIN: ICD-10-CM

## 2024-03-04 DIAGNOSIS — R06.09 EXERTIONAL DYSPNEA: ICD-10-CM

## 2024-03-04 DIAGNOSIS — I34.1 MITRAL VALVE PROLAPSE: ICD-10-CM

## 2024-03-04 LAB
AORTIC ARCH: 2.9 CM
AORTIC DIMENSIONLESS INDEX: 0.9 (DI)
ASCENDING AORTA: 2.9 CM
BH CV ECHO MEAS - ACS: 1.89 CM
BH CV ECHO MEAS - AO MAX PG: 6.5 MMHG
BH CV ECHO MEAS - AO MEAN PG: 3.8 MMHG
BH CV ECHO MEAS - AO V2 MAX: 127.6 CM/SEC
BH CV ECHO MEAS - AO V2 VTI: 34 CM
BH CV ECHO MEAS - AVA(I,D): 2.19 CM2
BH CV ECHO MEAS - EDV(CUBED): 94.8 ML
BH CV ECHO MEAS - EDV(MOD-SP2): 50 ML
BH CV ECHO MEAS - EDV(MOD-SP4): 54 ML
BH CV ECHO MEAS - EF(MOD-BP): 59.4 %
BH CV ECHO MEAS - EF(MOD-SP2): 58 %
BH CV ECHO MEAS - EF(MOD-SP4): 55.6 %
BH CV ECHO MEAS - ESV(CUBED): 23.1 ML
BH CV ECHO MEAS - ESV(MOD-SP2): 21 ML
BH CV ECHO MEAS - ESV(MOD-SP4): 24 ML
BH CV ECHO MEAS - FS: 37.6 %
BH CV ECHO MEAS - IVS/LVPW: 0.99 CM
BH CV ECHO MEAS - IVSD: 1.02 CM
BH CV ECHO MEAS - LAT PEAK E' VEL: 8.1 CM/SEC
BH CV ECHO MEAS - LV DIASTOLIC VOL/BSA (35-75): 28.2 CM2
BH CV ECHO MEAS - LV MASS(C)D: 163.4 GRAMS
BH CV ECHO MEAS - LV MAX PG: 5.5 MMHG
BH CV ECHO MEAS - LV MEAN PG: 3 MMHG
BH CV ECHO MEAS - LV SYSTOLIC VOL/BSA (12-30): 12.5 CM2
BH CV ECHO MEAS - LV V1 MAX: 117 CM/SEC
BH CV ECHO MEAS - LV V1 VTI: 30 CM
BH CV ECHO MEAS - LVIDD: 4.6 CM
BH CV ECHO MEAS - LVIDS: 2.8 CM
BH CV ECHO MEAS - LVOT AREA: 2.48 CM2
BH CV ECHO MEAS - LVOT DIAM: 1.78 CM
BH CV ECHO MEAS - LVPWD: 1.04 CM
BH CV ECHO MEAS - MED PEAK E' VEL: 8.9 CM/SEC
BH CV ECHO MEAS - MV A DUR: 0.14 SEC
BH CV ECHO MEAS - MV A MAX VEL: 75 CM/SEC
BH CV ECHO MEAS - MV DEC SLOPE: 333.1 CM/SEC2
BH CV ECHO MEAS - MV DEC TIME: 120 SEC
BH CV ECHO MEAS - MV E MAX VEL: 108 CM/SEC
BH CV ECHO MEAS - MV E/A: 1.44
BH CV ECHO MEAS - MV MAX PG: 6 MMHG
BH CV ECHO MEAS - MV MEAN PG: 1.9 MMHG
BH CV ECHO MEAS - MV P1/2T: 107.4 MSEC
BH CV ECHO MEAS - MV V2 VTI: 47.2 CM
BH CV ECHO MEAS - MVA(P1/2T): 2.05 CM2
BH CV ECHO MEAS - MVA(VTI): 1.58 CM2
BH CV ECHO MEAS - PA ACC TIME: 0.08 SEC
BH CV ECHO MEAS - PA V2 MAX: 114 CM/SEC
BH CV ECHO MEAS - PULM A REVS DUR: 0.13 SEC
BH CV ECHO MEAS - PULM A REVS VEL: 24.4 CM/SEC
BH CV ECHO MEAS - PULM DIAS VEL: 50.1 CM/SEC
BH CV ECHO MEAS - PULM S/D: 1.12
BH CV ECHO MEAS - PULM SYS VEL: 56.1 CM/SEC
BH CV ECHO MEAS - QP/QS: 0.41
BH CV ECHO MEAS - RV MAX PG: 0.99 MMHG
BH CV ECHO MEAS - RV V1 MAX: 49.7 CM/SEC
BH CV ECHO MEAS - RV V1 VTI: 12.5 CM
BH CV ECHO MEAS - RVOT DIAM: 1.76 CM
BH CV ECHO MEAS - SI(MOD-SP2): 15.2 ML/M2
BH CV ECHO MEAS - SI(MOD-SP4): 15.7 ML/M2
BH CV ECHO MEAS - SV(LVOT): 74.5 ML
BH CV ECHO MEAS - SV(MOD-SP2): 29 ML
BH CV ECHO MEAS - SV(MOD-SP4): 30 ML
BH CV ECHO MEAS - SV(RVOT): 30.6 ML
BH CV ECHO MEAS - TAPSE (>1.6): 2.3 CM
BH CV ECHO MEASUREMENTS AVERAGE E/E' RATIO: 12.71
BH CV NUCLEAR PRIOR STUDY: 2
BH CV REST NUCLEAR ISOTOPE DOSE: 10.9 MCI
BH CV STRESS BP STAGE 1: NORMAL
BH CV STRESS COMMENTS STAGE 1: NORMAL
BH CV STRESS DOSE REGADENOSON STAGE 1: 0.4
BH CV STRESS DURATION MIN STAGE 1: 0
BH CV STRESS DURATION SEC STAGE 1: 10
BH CV STRESS HR STAGE 1: 96
BH CV STRESS NUCLEAR ISOTOPE DOSE: 35.7 MCI
BH CV STRESS PROTOCOL 1: NORMAL
BH CV STRESS RECOVERY BP: NORMAL MMHG
BH CV STRESS RECOVERY HR: 83 BPM
BH CV STRESS STAGE 1: 1
BH CV XLRA - RV BASE: 2.16 CM
BH CV XLRA - RV LENGTH: 5.1 CM
BH CV XLRA - RV MID: 1.98 CM
BH CV XLRA - TDI S': 12.6 CM/SEC
LEFT ATRIUM VOLUME INDEX: 24.5 ML/M2
LV EF NUC BP: 72 %
MAXIMAL PREDICTED HEART RATE: 153 BPM
PERCENT MAX PREDICTED HR: 62.75 %
SINUS: 2.7 CM
STJ: 2.37 CM
STRESS BASELINE BP: NORMAL MMHG
STRESS BASELINE HR: 57 BPM
STRESS PERCENT HR: 74 %
STRESS POST EXERCISE DUR SEC: 10 SEC
STRESS POST PEAK BP: NORMAL MMHG
STRESS POST PEAK HR: 96 BPM
STRESS TARGET HR: 130 BPM

## 2024-03-04 PROCEDURE — 93306 TTE W/DOPPLER COMPLETE: CPT

## 2024-03-04 PROCEDURE — 78452 HT MUSCLE IMAGE SPECT MULT: CPT

## 2024-03-04 PROCEDURE — 78452 HT MUSCLE IMAGE SPECT MULT: CPT | Performed by: STUDENT IN AN ORGANIZED HEALTH CARE EDUCATION/TRAINING PROGRAM

## 2024-03-04 PROCEDURE — 93018 CV STRESS TEST I&R ONLY: CPT | Performed by: STUDENT IN AN ORGANIZED HEALTH CARE EDUCATION/TRAINING PROGRAM

## 2024-03-04 PROCEDURE — 93306 TTE W/DOPPLER COMPLETE: CPT | Performed by: INTERNAL MEDICINE

## 2024-03-04 PROCEDURE — 93017 CV STRESS TEST TRACING ONLY: CPT

## 2024-03-04 PROCEDURE — 0 TECHNETIUM TETROFOSMIN KIT: Performed by: INTERNAL MEDICINE

## 2024-03-04 PROCEDURE — A9502 TC99M TETROFOSMIN: HCPCS | Performed by: INTERNAL MEDICINE

## 2024-03-04 PROCEDURE — 93016 CV STRESS TEST SUPVJ ONLY: CPT | Performed by: STUDENT IN AN ORGANIZED HEALTH CARE EDUCATION/TRAINING PROGRAM

## 2024-03-04 PROCEDURE — 25010000002 REGADENOSON 0.4 MG/5ML SOLUTION: Performed by: INTERNAL MEDICINE

## 2024-03-04 RX ORDER — REGADENOSON 0.08 MG/ML
0.4 INJECTION, SOLUTION INTRAVENOUS
Status: COMPLETED | OUTPATIENT
Start: 2024-03-04 | End: 2024-03-04

## 2024-03-04 RX ADMIN — REGADENOSON 0.4 MG: 0.08 INJECTION, SOLUTION INTRAVENOUS at 08:40

## 2024-03-04 RX ADMIN — TETROFOSMIN 1 DOSE: 1.38 INJECTION, POWDER, LYOPHILIZED, FOR SOLUTION INTRAVENOUS at 08:00

## 2024-03-04 RX ADMIN — TETROFOSMIN 1 DOSE: 1.38 INJECTION, POWDER, LYOPHILIZED, FOR SOLUTION INTRAVENOUS at 08:40

## 2024-03-04 NOTE — PROGRESS NOTES
Please let the patient know that stress study shows no evidence for low blood flow to the heart.  Echocardiogram shows stable mitral valve prolapse with minimal leaking of the valve and no major concerns.

## 2024-03-04 NOTE — TELEPHONE ENCOUNTER
----- Message from Niko Ferrer Jr., MD sent at 3/4/2024  4:00 PM EST -----  Please let the patient know that stress study shows no evidence for low blood flow to the heart.  Echocardiogram shows stable mitral valve prolapse with minimal leaking of the valve and no major concerns.

## 2024-03-04 NOTE — TELEPHONE ENCOUNTER
Results and recommendations called to pt.  Instructed to call with any further questions or concerns.  Verbalized understanding.  6 month follow up scheduled per Dr. Ferrer's office note.    Viviana Harris RN  Triage Nurse, [facility] [place]  03/04/24 16:10 EST

## 2024-09-11 ENCOUNTER — OFFICE VISIT (OUTPATIENT)
Dept: CARDIOLOGY | Facility: CLINIC | Age: 69
End: 2024-09-11
Payer: MEDICARE

## 2024-09-11 VITALS
SYSTOLIC BLOOD PRESSURE: 150 MMHG | WEIGHT: 181 LBS | DIASTOLIC BLOOD PRESSURE: 88 MMHG | HEIGHT: 64 IN | BODY MASS INDEX: 30.9 KG/M2 | HEART RATE: 56 BPM

## 2024-09-11 DIAGNOSIS — I34.1 MITRAL VALVE PROLAPSE: ICD-10-CM

## 2024-09-11 DIAGNOSIS — I10 ESSENTIAL HYPERTENSION: Primary | ICD-10-CM

## 2024-09-11 PROCEDURE — 1160F RVW MEDS BY RX/DR IN RCRD: CPT | Performed by: NURSE PRACTITIONER

## 2024-09-11 PROCEDURE — 1159F MED LIST DOCD IN RCRD: CPT | Performed by: NURSE PRACTITIONER

## 2024-09-11 PROCEDURE — 93000 ELECTROCARDIOGRAM COMPLETE: CPT | Performed by: NURSE PRACTITIONER

## 2024-09-11 PROCEDURE — 99214 OFFICE O/P EST MOD 30 MIN: CPT | Performed by: NURSE PRACTITIONER

## 2024-09-11 RX ORDER — LOSARTAN POTASSIUM 100 MG/1
100 TABLET ORAL DAILY
COMMUNITY

## 2024-09-11 NOTE — PROGRESS NOTES
Date of Office Visit: 24  Encounter Provider: KIM Raya  Place of Service: [facility] CARDIOLOGY  Patient Name: Viviana Hyatt  :1955    Chief Complaint   Patient presents with    Follow-up    Hypertension   :     HPI: Viviana Hyatt is a 69 y.o. female  with hypertension, mitral valve prolapse with mild mitral valve regurgitation, prediabetes, obesity.  She has history of kidney stones and H. pylori.    She is followed by Dr. Niko Ferrer.  I will visit with her for the first time I have reviewed her medical record.    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  In her mid to late 30s she had cardiac evaluation after her father had CABG and was told that she had mild mitral valve prolapse.      In 2024 she complained of precordial pain and dyspnea on exertion.  Echocardiogram 2024 showed normal left ventricular systolic function, mild bileaflet mitral valve prolapse with mild mitral valve regurgitation.  Perfusion stress test shows no evidence of ischemia was low risk with an EF greater than 70%.    She presents today for reassessment.  She checks her blood pressure daily at home which is normally 130/70-80.  She has no chest pain or shortness of breath or edema or palpitations.  She has history of vertigo and relates dizziness to that.  Her 91-year-old mother passed away  not long after being diagnosed with pancreatic cancer.  She has been out of routine exercise due to helping to care for her mother but plans to start back soon.  She reportedly follows a low-sodium diet.    She lives in [facility] and drives approximately an hour and a half to get to our office.  No Known Allergies        Family and social history reviewed.     ROS  All other systems were reviewed and are negative          Objective:     Vitals:    24 0804   BP: 150/88   BP Location: Left arm   Patient Position: Sitting   Pulse: 56   Weight: 82.1 kg (181 lb)  "  Height: 161.3 cm (63.5\")     Body mass index is 31.56 kg/m².    PHYSICAL EXAM:  Pulmonary:      Effort: Pulmonary effort is normal.      Breath sounds: Normal breath sounds.   Cardiovascular:      Normal rate. Regular rhythm.           ECG 12 Lead    Date/Time: 9/11/2024 8:18 AM  Performed by: Reena Berry APRN    Authorized by: Reena Berry APRN  Comparison: compared with previous ECG   Similar to previous ECG  Rhythm: sinus rhythm  Rate: normal  QRS axis: left  Comments: No change            Current Outpatient Medications   Medication Sig Dispense Refill    losartan (COZAAR) 100 MG tablet Take 1 tablet by mouth Daily.       No current facility-administered medications for this visit.     Assessment:       Diagnosis Plan   1. Essential hypertension  ECG 12 Lead      2. Mitral valve prolapse             Orders Placed This Encounter   Procedures    ECG 12 Lead     This order was created via procedure documentation     Order Specific Question:   Release to patient     Answer:   Routine Release [[id number]]         Plan:   1. 69-year-old female with hypertension blood pressure is elevated in clinic but she follows this at home which is normally better at 130/70 at home.  We discussed low-sodium diet.  We also discussed getting back into routine exercise and she plans to start  2. Mild mitral valve prolapse with mild MR on echo March 2024.  This was noted in her late 30s originally.  3.  History of prediabetes-last hemoglobin A1c was within normal range  4.  Obesity-BMI 31.56.-She is lost a few pounds.  She plans to get back into routine exercise.  5 to 10 pound weight loss would be overall beneficial  5.  History of kidney stones  6 for history of H. pylori    Follow-up in cardiology clinic in 1 year  She lives in [facility] and drives approximately an hour and a half to get to our office.          It has been a pleasure to participate in this patient's care.      Thank you,  KIM Raya      **I used " Dragon to dictate this note:**

## (undated) DEVICE — TUBING, SUCTION, 1/4" X 10', STRAIGHT: Brand: MEDLINE

## (undated) DEVICE — CANN O2 ETCO2 FITS ALL CONN CO2 SMPL A/ 7IN DISP LF

## (undated) DEVICE — FRCP BX RADJAW4 NDL 2.8 240CM LG OG BX40

## (undated) DEVICE — CANN NASL CO2 TRULINK W/O2 A/

## (undated) DEVICE — KT ORCA ORCAPOD DISP STRL

## (undated) DEVICE — SENSR O2 OXIMAX FNGR A/ 18IN NONSTR

## (undated) DEVICE — BITEBLOCK OMNI BLOC

## (undated) DEVICE — LN SMPL CO2 SHTRM SD STREAM W/M LUER

## (undated) DEVICE — Device: Brand: DEFENDO AIR/WATER/SUCTION AND BIOPSY VALVE

## (undated) DEVICE — ADAPT CLN BIOGUARD AIR/H2O DISP